# Patient Record
Sex: MALE | Race: WHITE
[De-identification: names, ages, dates, MRNs, and addresses within clinical notes are randomized per-mention and may not be internally consistent; named-entity substitution may affect disease eponyms.]

---

## 2023-02-18 ENCOUNTER — HOSPITAL ENCOUNTER (INPATIENT)
Dept: HOSPITAL 95 - ER | Age: 23
LOS: 6 days | Discharge: HOME | DRG: 896 | End: 2023-02-24
Attending: STUDENT IN AN ORGANIZED HEALTH CARE EDUCATION/TRAINING PROGRAM | Admitting: STUDENT IN AN ORGANIZED HEALTH CARE EDUCATION/TRAINING PROGRAM
Payer: COMMERCIAL

## 2023-02-18 VITALS — HEIGHT: 72 IN | BODY MASS INDEX: 19.02 KG/M2 | WEIGHT: 140.43 LBS

## 2023-02-18 DIAGNOSIS — D69.6: ICD-10-CM

## 2023-02-18 DIAGNOSIS — G92.8: ICD-10-CM

## 2023-02-18 DIAGNOSIS — D64.9: ICD-10-CM

## 2023-02-18 DIAGNOSIS — E87.0: ICD-10-CM

## 2023-02-18 DIAGNOSIS — J69.0: ICD-10-CM

## 2023-02-18 DIAGNOSIS — E88.09: ICD-10-CM

## 2023-02-18 DIAGNOSIS — E87.6: ICD-10-CM

## 2023-02-18 DIAGNOSIS — T40.2X5A: ICD-10-CM

## 2023-02-18 DIAGNOSIS — F11.23: Primary | ICD-10-CM

## 2023-02-18 DIAGNOSIS — E86.0: ICD-10-CM

## 2023-02-18 DIAGNOSIS — Z79.2: ICD-10-CM

## 2023-02-18 DIAGNOSIS — N17.9: ICD-10-CM

## 2023-02-18 DIAGNOSIS — E87.3: ICD-10-CM

## 2023-02-18 DIAGNOSIS — T43.655A: ICD-10-CM

## 2023-02-18 DIAGNOSIS — Z79.899: ICD-10-CM

## 2023-02-18 DIAGNOSIS — E83.39: ICD-10-CM

## 2023-02-18 LAB
ALBUMIN SERPL BCP-MCNC: 5.2 G/DL (ref 3.4–5)
ALBUMIN/GLOB SERPL: 1.4 {RATIO} (ref 0.8–1.8)
ALT SERPL W P-5'-P-CCNC: 56 U/L (ref 12–78)
ANION GAP SERPL CALCULATED.4IONS-SCNC: 10 MMOL/L (ref 6–16)
AST SERPL W P-5'-P-CCNC: 20 U/L (ref 12–37)
BASOPHILS # BLD AUTO: 0.03 K/MM3 (ref 0–0.23)
BASOPHILS NFR BLD AUTO: 0 % (ref 0–2)
BILIRUB SERPL-MCNC: 0.8 MG/DL (ref 0.1–1)
BUN SERPL-MCNC: 33 MG/DL (ref 8–24)
CALCIUM SERPL-MCNC: 10.4 MG/DL (ref 8.5–10.1)
CHLORIDE SERPL-SCNC: 92 MMOL/L (ref 98–108)
CO2 SERPL-SCNC: 38 MMOL/L (ref 21–32)
CREAT SERPL-MCNC: 1.27 MG/DL (ref 0.6–1.2)
DEPRECATED RDW RBC AUTO: 35.3 FL (ref 35.1–46.3)
EOSINOPHIL # BLD AUTO: 0 K/MM3 (ref 0–0.68)
EOSINOPHIL NFR BLD AUTO: 0 % (ref 0–6)
ERYTHROCYTE [DISTWIDTH] IN BLOOD BY AUTOMATED COUNT: 11.9 % (ref 11.7–14.2)
GLOBULIN SER CALC-MCNC: 3.8 G/DL (ref 2.2–4)
GLUCOSE SERPL-MCNC: 167 MG/DL (ref 70–99)
HCT VFR BLD AUTO: 49.7 % (ref 37–53)
HGB BLD-MCNC: 18.3 G/DL (ref 13.5–17.5)
IMM GRANULOCYTES # BLD AUTO: 0.05 K/MM3 (ref 0–0.1)
IMM GRANULOCYTES NFR BLD AUTO: 0 % (ref 0–1)
LYMPHOCYTES # BLD AUTO: 1.46 K/MM3 (ref 0.84–5.2)
LYMPHOCYTES NFR BLD AUTO: 9 % (ref 21–46)
MAGNESIUM SERPL-MCNC: 2.6 MG/DL (ref 1.6–2.4)
MCHC RBC AUTO-ENTMCNC: 36.8 G/DL (ref 31.5–36.5)
MCV RBC AUTO: 82 FL (ref 80–100)
MONOCYTES # BLD AUTO: 2.13 K/MM3 (ref 0.16–1.47)
MONOCYTES NFR BLD AUTO: 13 % (ref 4–13)
NEUTROPHILS # BLD AUTO: 12.51 K/MM3 (ref 1.96–9.15)
NEUTROPHILS NFR BLD AUTO: 77 % (ref 41–73)
NRBC # BLD AUTO: 0 K/MM3 (ref 0–0.02)
NRBC BLD AUTO-RTO: 0 /100 WBC (ref 0–0.2)
PLATELET # BLD AUTO: 271 K/MM3 (ref 150–400)
POTASSIUM SERPL-SCNC: 3.2 MMOL/L (ref 3.5–5.5)
PROT SERPL-MCNC: 9 G/DL (ref 6.4–8.2)
SODIUM SERPL-SCNC: 140 MMOL/L (ref 136–145)

## 2023-02-18 PROCEDURE — C1769 GUIDE WIRE: HCPCS

## 2023-02-18 PROCEDURE — C9113 INJ PANTOPRAZOLE SODIUM, VIA: HCPCS

## 2023-02-18 PROCEDURE — C1751 CATH, INF, PER/CENT/MIDLINE: HCPCS

## 2023-02-18 PROCEDURE — G0378 HOSPITAL OBSERVATION PER HR: HCPCS

## 2023-02-18 PROCEDURE — A9270 NON-COVERED ITEM OR SERVICE: HCPCS

## 2023-02-19 LAB
ALBUMIN SERPL BCP-MCNC: 4.3 G/DL (ref 3.4–5)
ALBUMIN/GLOB SERPL: 1.3 {RATIO} (ref 0.8–1.8)
ALT SERPL W P-5'-P-CCNC: 42 U/L (ref 12–78)
ANION GAP SERPL CALCULATED.4IONS-SCNC: 6 MMOL/L (ref 6–16)
AST SERPL W P-5'-P-CCNC: 18 U/L (ref 12–37)
BASOPHILS # BLD AUTO: 0.01 K/MM3 (ref 0–0.23)
BASOPHILS NFR BLD AUTO: 0 % (ref 0–2)
BILIRUB SERPL-MCNC: 1.2 MG/DL (ref 0.1–1)
BUN SERPL-MCNC: 33 MG/DL (ref 8–24)
CALCIUM SERPL-MCNC: 8.7 MG/DL (ref 8.5–10.1)
CHLORIDE SERPL-SCNC: 97 MMOL/L (ref 98–108)
CO2 SERPL-SCNC: 38 MMOL/L (ref 21–32)
CREAT SERPL-MCNC: 1.13 MG/DL (ref 0.6–1.2)
DEPRECATED RDW RBC AUTO: 36.6 FL (ref 35.1–46.3)
EOSINOPHIL # BLD AUTO: 0.03 K/MM3 (ref 0–0.68)
EOSINOPHIL NFR BLD AUTO: 0 % (ref 0–6)
ERYTHROCYTE [DISTWIDTH] IN BLOOD BY AUTOMATED COUNT: 12.1 % (ref 11.7–14.2)
GLOBULIN SER CALC-MCNC: 3.2 G/DL (ref 2.2–4)
GLUCOSE SERPL-MCNC: 145 MG/DL (ref 70–99)
HCT VFR BLD AUTO: 44.6 % (ref 37–53)
HGB BLD-MCNC: 16.2 G/DL (ref 13.5–17.5)
IMM GRANULOCYTES # BLD AUTO: 0.06 K/MM3 (ref 0–0.1)
IMM GRANULOCYTES NFR BLD AUTO: 0 % (ref 0–1)
KETONES UR STRIP-MCNC: (no result) MG/DL
LEUKOCYTE ESTERASE UR QL STRIP: (no result)
LYMPHOCYTES # BLD AUTO: 2.28 K/MM3 (ref 0.84–5.2)
LYMPHOCYTES NFR BLD AUTO: 13 % (ref 21–46)
MAGNESIUM SERPL-MCNC: 2.7 MG/DL (ref 1.6–2.4)
MCHC RBC AUTO-ENTMCNC: 36.3 G/DL (ref 31.5–36.5)
MCV RBC AUTO: 84 FL (ref 80–100)
MONOCYTES # BLD AUTO: 2.55 K/MM3 (ref 0.16–1.47)
MONOCYTES NFR BLD AUTO: 15 % (ref 4–13)
NEUTROPHILS # BLD AUTO: 12.29 K/MM3 (ref 1.96–9.15)
NEUTROPHILS NFR BLD AUTO: 71 % (ref 41–73)
NRBC # BLD AUTO: 0 K/MM3 (ref 0–0.02)
NRBC BLD AUTO-RTO: 0 /100 WBC (ref 0–0.2)
PLATELET # BLD AUTO: 219 K/MM3 (ref 150–400)
POTASSIUM SERPL-SCNC: 2.5 MMOL/L (ref 3.5–5.5)
PROT SERPL-MCNC: 7.5 G/DL (ref 6.4–8.2)
PROT UR STRIP-MCNC: (no result) MG/DL
SODIUM SERPL-SCNC: 141 MMOL/L (ref 136–145)
SP GR SPEC: 1.01 (ref 1–1.02)
UROBILINOGEN UR STRIP-MCNC: (no result) MG/DL
WBC #/AREA URNS HPF: (no result) /HPF (ref 0–5)

## 2023-02-19 NOTE — NUR
Summary: Patient admitted overnight for intractable n/v related to opiod
withdrawl. Patient arrived with fever. Recieved order for rectal tylenol as
patient was throwing up. Patient confused stating he came to the hospital to
get a job. K+ replaced overnight. Fluids running. PRNs given per MAR. Diet
changed to clear liquid. Educated patient that we would not give him food per
his requests while he was actively vomitting. Educated him to take small sips.
VSS. Call light in reach. Bed alarm on due to patient confusion.

## 2023-02-19 NOTE — NUR
CRITICAL LAB REPORTED FOR A POTASSIUM OF 2.4, DR. HATCH NOTIFIED.  NEW ORDERS
PER EMAR. REPORT RECIEVED FROM MARCELINO OAKLEY IN LAB.

## 2023-02-19 NOTE — NUR
SHIFT SUMMARY
 
PT AOX1 AT THE MOMENT, UNABLE TO TRULY VERBALIZE MUCH.  HE BECAME IRRITABLE A
FEW TIMES THROUGHOUT THE SHIFT BUT WAS REDIRECTABLE.  HE VOMITED MULTIPLE
TIMES IN HIS BED WHICH REQUIRED MULTIPLE FULL BED CHANGES.  MOST OF THE
VOMITING SPELLS OCCURRED AFTER HE CHUGGED WATER. ATTEMPTED TO EDUCATE THE PT
ON NOT DOING SO BUT INSTEAD SIPPING AND HE MAY HAVE FINALLY UNDERSTOOD AND IS
NOW SIPPING AND EATING ICE CHIPS.  LESS VOMITING HAS OCCURRED AS THE SHIFT
PROGRESSED.  PT RECIEVED 60 MEQ OF IV POTASSIUM THAT IS DOCUMENTED ON THE EMAR
AS WELL AS MULTIPLE DOSES OF ANTI-NAUSEA MEDICATION ALSO ON THE EMAR.  PT'S
FATHER AND BROTHER VISITED THIS SHIFT.  WILL REPORT TO ONCOMING NURSE.

## 2023-02-20 LAB
ALBUMIN SERPL BCP-MCNC: 4.9 G/DL (ref 3.4–5)
ANION GAP SERPL CALCULATED.4IONS-SCNC: 10 MMOL/L (ref 6–16)
ANION GAP SERPL CALCULATED.4IONS-SCNC: 8 MMOL/L (ref 6–16)
BASOPHILS # BLD AUTO: 0.03 K/MM3 (ref 0–0.23)
BASOPHILS NFR BLD AUTO: 0 % (ref 0–2)
BUN SERPL-MCNC: 46 MG/DL (ref 8–24)
BUN SERPL-MCNC: 53 MG/DL (ref 8–24)
BUPRENORPHINE UR-MCNC: DETECTED NG/ML
CALCIUM SERPL-MCNC: 10.2 MG/DL (ref 8.5–10.1)
CALCIUM SERPL-MCNC: 9.3 MG/DL (ref 8.5–10.1)
CHLORIDE SERPL-SCNC: 85 MMOL/L (ref 98–108)
CHLORIDE SERPL-SCNC: 92 MMOL/L (ref 98–108)
CO2 SERPL-SCNC: 43 MMOL/L (ref 21–32)
CO2 SERPL-SCNC: 44 MMOL/L (ref 21–32)
CREAT SERPL-MCNC: 1.43 MG/DL (ref 0.6–1.2)
CREAT SERPL-MCNC: 1.48 MG/DL (ref 0.6–1.2)
DEPRECATED RDW RBC AUTO: 36.8 FL (ref 35.1–46.3)
EOSINOPHIL # BLD AUTO: 0 K/MM3 (ref 0–0.68)
EOSINOPHIL NFR BLD AUTO: 0 % (ref 0–6)
ERYTHROCYTE [DISTWIDTH] IN BLOOD BY AUTOMATED COUNT: 11.9 % (ref 11.7–14.2)
GLUCOSE SERPL-MCNC: 143 MG/DL (ref 70–99)
GLUCOSE SERPL-MCNC: 168 MG/DL (ref 70–99)
HCT VFR BLD AUTO: 51.7 % (ref 37–53)
HGB BLD-MCNC: 18.7 G/DL (ref 13.5–17.5)
IMM GRANULOCYTES # BLD AUTO: 0.06 K/MM3 (ref 0–0.1)
IMM GRANULOCYTES NFR BLD AUTO: 0 % (ref 0–1)
KETONES UR STRIP-MCNC: (no result) MG/DL
LEUKOCYTE ESTERASE UR QL STRIP: (no result)
LYMPHOCYTES # BLD AUTO: 2.4 K/MM3 (ref 0.84–5.2)
LYMPHOCYTES NFR BLD AUTO: 15 % (ref 21–46)
MAGNESIUM SERPL-MCNC: 3.6 MG/DL (ref 1.6–2.4)
MCHC RBC AUTO-ENTMCNC: 36.2 G/DL (ref 31.5–36.5)
MCV RBC AUTO: 85 FL (ref 80–100)
MONOCYTES # BLD AUTO: 2.5 K/MM3 (ref 0.16–1.47)
MONOCYTES NFR BLD AUTO: 15 % (ref 4–13)
NEUTROPHILS # BLD AUTO: 11.35 K/MM3 (ref 1.96–9.15)
NEUTROPHILS NFR BLD AUTO: 69 % (ref 41–73)
NRBC # BLD AUTO: 0 K/MM3 (ref 0–0.02)
NRBC BLD AUTO-RTO: 0 /100 WBC (ref 0–0.2)
PH BLDV: 7.64 [PH] (ref 7.34–7.37)
PHOSPHATE SERPL-MCNC: 4.4 MG/DL (ref 2.5–4.9)
PLATELET # BLD AUTO: 253 K/MM3 (ref 150–400)
POTASSIUM SERPL-SCNC: 2.2 MMOL/L (ref 3.5–5.5)
POTASSIUM SERPL-SCNC: 2.5 MMOL/L (ref 3.5–5.5)
PROT UR STRIP-MCNC: (no result) MG/DL
SODIUM SERPL-SCNC: 139 MMOL/L (ref 136–145)
SODIUM SERPL-SCNC: 143 MMOL/L (ref 136–145)
SP GR SPEC: 1.01 (ref 1–1.02)
URN SPEC COLLECT METH UR: (no result)
UROBILINOGEN UR STRIP-MCNC: (no result) MG/DL
WBC #/AREA URNS HPF: (no result) /HPF (ref 0–5)

## 2023-02-20 PROCEDURE — 0DH67UZ INSERTION OF FEEDING DEVICE INTO STOMACH, VIA NATURAL OR ARTIFICIAL OPENING: ICD-10-PCS | Performed by: RADIOLOGY

## 2023-02-20 PROCEDURE — 4A133R1 MONITORING OF ARTERIAL SATURATION, PERIPHERAL, PERCUTANEOUS APPROACH: ICD-10-PCS | Performed by: STUDENT IN AN ORGANIZED HEALTH CARE EDUCATION/TRAINING PROGRAM

## 2023-02-20 PROCEDURE — 02HV33Z INSERTION OF INFUSION DEVICE INTO SUPERIOR VENA CAVA, PERCUTANEOUS APPROACH: ICD-10-PCS | Performed by: RADIOLOGY

## 2023-02-20 NOTE — NUR
POTASSIUM INFUSING X2 FOR A TOTAL OF 20MEQ/HR PER DR VALLEJO AND PHARMACY. K+
INFUSING THROUGH CENTRAL LINE. PICC LINE PLACED TO LEILANI; PT DEION PROCEDURE WELL.
2800CC DARK GREEN OUT PUT FROM NG TUBE. 1L NS BOLUS STARTED PER DR VALLEJO. O2
INCREASED TO 3L TO KEEP SATS >90%. TEMP 102.0 FAN PLACED, BLANKET REMOVED,
SHEET PLACED, COOL RAG TO FOREHEAD. MULTIPLE FAMILY MEMBERS AT BEDSIDE
INCLUDING PT'S FATHER AND GRANDMOTHER. PT IS WAKING UP A LITTLE MORE OFTEN,
REQUESTS SUCTION CATHETER.

## 2023-02-20 NOTE — NUR
NOTIFIED PATIENT FATHER THAT HE MOVED TO ROOM [facility] BY VOICEMAIL AT
[phone].

## 2023-02-20 NOTE — NUR
PT ADMITTED TO ICU AT 0646 FROM PCU FOR AMS. PT MINIMALLY RESPONSIVE, HE DID
NOT AWAKEN/RESPOND WHEN HE WAS TRANSFERED TO ICU BED OR WHEN HICKMAN CATH
PLACED. AFTER TURN TO LOOK AT SKIN, WE HAD BEEN SPEAKING OUT LOUD ABOUT WHAT
CAUSED SCAR ON HIS FEET, PT SPOKE UP,"IT'S FROM SMOKING". PT ABLE TO WEAKLY
FOLLOW SOME COMMANDS THEN DRIFTED BACK INTO DEEP SLEEP. TEMP 101.6 VIA TEMP
HICKMAN PROBE. 14F HICKMAN PLACED W/O DIFFICULTY. CRITICAL HIGH PH OF 7.64 CALLED
TO DR VALLEJO. 2L VIA N/C PLACED ON PT FOR SATS 90% ON RA. PT HAS SOME
HYPOVENT. PT TACHY W RATE 115, HTN /102. FINE FLAT RED RASH THAT MOVED
AROUND ON CHEST NOTED, RASH BLANCHES. K+ INFUSING. LR INFUSING. PUPILS ARE
5/5MM EQUAL AND REACTIVE, BRISK. PT APPEARS DEHYDRATED, LIPS AND TONGUE ARE
DRY AND CRACKED, URINE VERY CONCENTRATED/DARK.

## 2023-02-20 NOTE — NUR
Summary: Patient had large amounts of emisis at start of shift. Patient
assisted by nurse into shower and bedding changed. Nurse limited patient to
clear liquids only as tolerated. Provided very small amount of fluids at a
time to prevent patient consuming quickly then causing vomitting. Patient
tolerated small sips of water, two popcycles, and tylenol crushed in
applesause. UA sent to lab. Call light in reach. Patient still pretty
lethargic only responds in one word short responses when he does respond.

## 2023-02-20 NOTE — NUR
DR HATCH IN TO SEE PT. TYLENOL DE ORDERED. PT BRIEFLY INTERMIT. AWAKENS WITH
DEEP PAINFUL STIMULI. PT COUGHED UP LARGE THICK GREY SPUTUM, SAMPLE SENT.
MESSAGE LEFT TO CALL ICU TO PT'S GRANDMOTHER AND FATHER.

## 2023-02-20 NOTE — NUR
Patient very lethargic this am. Critical K+ 2.2. EKG done with abnormal
results read to Dr. Jenkins. Recieved verbal order that he wants to transfer
patient to PCU. Notified charge RN. Patient VSS currently stable, IV K+
replacement running. Patient responding to all orientation questions but
returns to sleep after.

## 2023-02-20 NOTE — NUR
PT PULLED OUT NG TUBE, NGT TUBE REPLACED, PT DEION PROCEDURE WELL. 16F NGT
PLACED TO RIGHT NARE; LOW INTERMIT. SX. DARK BROWN/GREEN OUTPUT CONT. 3700
TOTAL NGT OUTPUT THIS SHIFT. PT RECEIVED 80MEQ K+ THIS MORNING AND IS NOW
RECIEVING ANOTHER 80MEQ K+. CHEM 8 TO BE DRAWN AFTER K+ INFUSES. NS INFUSING
AT 150CC/HR. RASH HAS IMPROVED. MENTATION DID IMPROVE SLIGHTLY LATE MORNING,
EARLY AFTERNOON, PT BECOMING MORE SOMNOLENT AGAIN, AWAKENS FROM SLEEP CONFUSED
(PULLED OUT NG), ABLE TO RE-DIRECT PT. BP HAS REMAINED STABLE T/O SHIFT, OCC
HTN. SINUS TACH T/O SHIFT, RATE IMPROVED AFTER LITER BOLUS. PT COUGHED UP
THICK SPUTUM EVERY 30-60 MIN T/O SHIFT WHICH WAS SUCTIONED. PT REQUIRED DEEP
SUCTION X2; SATS DROPPED TO 87% D/T THICK SECRETIONS AND NEEDED ASSIST.
CLEARING THEM. O2 INCREASED TO 4L VIA N/C TO KEEP SATS >90%. PT'S FATHER AT
BEDSIDE NOW. TMAX 102.5. RI TYLENOL GIVEN TWICE, FAN ON PT.

## 2023-02-21 LAB
ALBUMIN SERPL BCP-MCNC: 4 G/DL (ref 3.4–5)
ANION GAP SERPL CALCULATED.4IONS-SCNC: 6 MMOL/L (ref 6–16)
BASOPHILS # BLD AUTO: 0.05 K/MM3 (ref 0–0.23)
BASOPHILS NFR BLD AUTO: 0 % (ref 0–2)
BUN SERPL-MCNC: 46 MG/DL (ref 8–24)
CALCIUM SERPL-MCNC: 9.3 MG/DL (ref 8.5–10.1)
CHLORIDE SERPL-SCNC: 110 MMOL/L (ref 98–108)
CO2 SERPL-SCNC: 31 MMOL/L (ref 21–32)
CREAT SERPL-MCNC: 1.26 MG/DL (ref 0.6–1.2)
DEPRECATED RDW RBC AUTO: 38.5 FL (ref 35.1–46.3)
EOSINOPHIL # BLD AUTO: 0.01 K/MM3 (ref 0–0.68)
EOSINOPHIL NFR BLD AUTO: 0 % (ref 0–6)
ERYTHROCYTE [DISTWIDTH] IN BLOOD BY AUTOMATED COUNT: 12 % (ref 11.7–14.2)
GLUCOSE SERPL-MCNC: 128 MG/DL (ref 70–99)
HCT VFR BLD AUTO: 50.9 % (ref 37–53)
HGB BLD-MCNC: 17.9 G/DL (ref 13.5–17.5)
IMM GRANULOCYTES # BLD AUTO: 0.12 K/MM3 (ref 0–0.1)
IMM GRANULOCYTES NFR BLD AUTO: 1 % (ref 0–1)
LYMPHOCYTES # BLD AUTO: 2.51 K/MM3 (ref 0.84–5.2)
LYMPHOCYTES NFR BLD AUTO: 11 % (ref 21–46)
MAGNESIUM SERPL-MCNC: 3.4 MG/DL (ref 1.6–2.4)
MCHC RBC AUTO-ENTMCNC: 35.2 G/DL (ref 31.5–36.5)
MCV RBC AUTO: 87 FL (ref 80–100)
MONOCYTES # BLD AUTO: 3.1 K/MM3 (ref 0.16–1.47)
MONOCYTES NFR BLD AUTO: 13 % (ref 4–13)
NEUTROPHILS # BLD AUTO: 17.64 K/MM3 (ref 1.96–9.15)
NEUTROPHILS NFR BLD AUTO: 75 % (ref 41–73)
NRBC # BLD AUTO: 0 K/MM3 (ref 0–0.02)
NRBC BLD AUTO-RTO: 0 /100 WBC (ref 0–0.2)
PH BLDV: 7.51 [PH] (ref 7.34–7.37)
PHOSPHATE SERPL-MCNC: 1.6 MG/DL (ref 2.5–4.9)
PLATELET # BLD AUTO: 220 K/MM3 (ref 150–400)
POTASSIUM SERPL-SCNC: 2.8 MMOL/L (ref 3.5–5.5)
SODIUM SERPL-SCNC: 147 MMOL/L (ref 136–145)
VANCOMYCIN TROUGH SERPL-MCNC: 22 UG/ML (ref 5–10)

## 2023-02-21 NOTE — NUR
NOTIFIED BY LAB OF CRITICAL POTASSIUM OF 2.4.  CONSULTED WITH ALIA IN PHARMACY
FOR POSSIBILITIES OF NEXT POTASSIUM ORDERS.  CALLED DR ALDANA WITH
SUGGESTIONS FROM LAB. CONSENSUS TO ORDER 60 MEQ'S  ML NS.  ORDER TO RUN
 MLS/HR TOTALLING 60 MEQ'S IN 3 HOURS.  CHARGE EDEL ZHAO INFORMED AS
WELL.

## 2023-02-21 NOTE — NUR
END OF SHIFT SUMMARY
PT COMPLACENT MOST OF THE NIGHT.  DOES NOT AWAKEN FOR MORE THAN A FEW SECONDS
AT A TIME WHEN SPOKEN TO.  FOLLOWS COMMANDS VERY SLOWLY.
 
RESP- 4 LPM NC WITH SPO2 >93%.  LUNG SOUNDS CLEAR BILATERALLY THROUGHOUT.
 
CARDIAC- ST WITH HR -120'S.  'S CONSISTANTLY THROUGHOUT NIGHT.
 
GI,-  1250 OUTPUT PER OGT.  DARK BROWN- COFFEE GROUND CONTENT ON INTERMITENT
SUCTION.  HICKMAN DRAINING TO GRAVITY WITH LIGHT YELLOW URINE PRESENT.
 
LABS DONE LATE THIS MORNING D/T K+ TREATMENT ENDING SHORTLY AFTER 5AM.
WILL CONTINUE TO MONITOR UNTIL DAYSHIFT RN IS GIVEN REPORT.

## 2023-02-21 NOTE — NUR
ASSUMED CARE OF PT AT 1915. REPORT RECEIVED AT BEDSIDE. PT PRESENTS IN BED.
HAS AIRVO IN PLACE. MAINTAINS SATURATIONS > 90 PERCENT. PT WILL OPEN EYES TO
TACTILE AND VERBAL STIMULI. HAS NGT TO LIWS. BILE COLORED DRAINAGE. PROTONIX
DRIP AT 8MG/HOUR. WILL REVIEW CHART AND PLAN OF CARE FOR THIS PT.

## 2023-02-21 NOTE — NUR
ASSUMED CARE / DR VALLEJO:
REPORT RECEIVED FROM LIZETTE AVITIA RN. ASSUMED CARE OF THIS PT AT APPROX 0700.
ON ASSESSMENT, THE PT IS RESTING QUIETLY. HE IS SOMNOLENT OVERALL BUT DOES
BEGIN TO AWAKEN W/ CONTINUED VERBAL & TACTILE STIMULUS. HE WILL ANSWER
QUESTIONS BY NODDING HEAD BUT NEEDS ASKED REPEATEDLY TO REMAIN AWAKE. PT IS
TACHYPNEIC & USING ACCESSORY MUSCLES DURING RESPIRATIONS. LS CLEAR T/O, O2
SATS BEGINNING TO TREND DOWN W/ AVG 90%, DROPPING TO 84% AT TIMES. PT
ENCOURAGED TO COUGH & DEEP BREATHE W/ O2 SATS IMPROVING TEMPORARILY. THIS RN
HAS CONTACTED SHAJI R, RT, W/ REQUEST FOR PT TO BE PLACED ON AIRVO. PT
TOLERATING WELL. MONITOR SHOWS ST W/ -130s, BP STABLE. PT NPO R/T
RESPIRATORY STATUS & SOMNOLENCE. TEMP HICKMAN PATENT/ DRAINING CLEAR YELLOW
URINE. SKIN CONDITION OVERALL INTACT, SCATTERED AREAS OF SCARRING, BURNS TO
TOPS OF BILATERAL FEET.
 
DR VALLEJO AT BEDSIDE THIS AM W/ THIS RN's CONCERNS ABOUT PT's PERSISTANT AMS &
DYSPNEA. THIS RN ALSO REQUESTS FURTHER POTASSIUM REPLETION AS THE PT's NEWEST
LABS SHOW LOW VALUE OF 2.8. DR VALLEJO HAS EVALUATED THE PT, ORDERS FOR KCL,
NAPHOS & CAGLUC PLACED. NO OTHER CHANGES AT THIS TIME.
WILL CONTINUE TO MONITOR & UPDATE AS NEEDED.

## 2023-02-21 NOTE — NUR
FULL CHG BATH DONE. PT TOLERATES THIS WELL. TACHYPNEA WITH RATES 30'S TO 40'S
AT TIMES. HE DID REMOVE HIS AIRVO EARLIER AND MAINTAINED 90-91 PERCENT ON ROOM
AIR. HE BECOMES MORE DYSPNEIC WITH AIRVO OFF. DID PLACE AIRVO BACK TO EASE
EFFORTS OF RESPIRATION. WILL CONTINUE TO MONITOR PT.

## 2023-02-21 NOTE — NUR
SHIFT SUMMARY:
NO ACUTE CHANGES SINCE PRIOR UPDATES. PT's MENTATION HAS IMPROVED SLIGHTLY &
HE IS NOW SPEAKING IN SHORT SENTENCES & ASKING APPROPRIATE QUESTIONS WHEN
AWAKE, ALTHOUGH DOES REMAIN SOMNOLENT MOST OF THE TIME. LS CLEAR T/O, PT ON
AIRVO W/ SETTINGS: 35 L/MIN & 40% FIO2. TOLERATING WELL W/ O2 SATS > 92% ON
AVG. MONITOR SHOWS ST W/ -130s, TRENDING DOWN AFTER 1L LR FLUID BOLUS
PER DR VALLEJO. PT NPO R/T AMS & COPIOUS AMNTS GASTRIC CONTENTS SUCTIONED
THROUGH NGT. NGT OUTPUT HAS GONE FROM COFFEE GROUNDS, TO JOHN RED, TO GREEN/
BILE COLORED & AMNT HAS DECREASED THIS AFTERNOON. TEMP HICKMAN PATENT/ DRAINING
CLEAR YELLOW URINE. SKIN CONDITION OVERALL INTACT, UNCHANGED. Q2H
REPOSITIONING TO MAINTAIN SKIN INTEGRITY.
WILL CONTINUE TO MONITOR & REPORT OFF TO ONCOMING RN.

## 2023-02-22 LAB
ALBUMIN SERPL BCP-MCNC: 2.8 G/DL (ref 3.4–5)
ALBUMIN/GLOB SERPL: 0.9 {RATIO} (ref 0.8–1.8)
ALT SERPL W P-5'-P-CCNC: 16 U/L (ref 12–78)
ANION GAP SERPL CALCULATED.4IONS-SCNC: 4 MMOL/L (ref 6–16)
ANION GAP SERPL CALCULATED.4IONS-SCNC: 8 MMOL/L (ref 6–16)
AST SERPL W P-5'-P-CCNC: 14 U/L (ref 12–37)
BASOPHILS # BLD AUTO: 0.01 K/MM3 (ref 0–0.23)
BASOPHILS NFR BLD AUTO: 0 % (ref 0–2)
BILIRUB SERPL-MCNC: 1.2 MG/DL (ref 0.1–1)
BUN SERPL-MCNC: 33 MG/DL (ref 8–24)
BUN SERPL-MCNC: 39 MG/DL (ref 8–24)
CALCIUM SERPL-MCNC: 8.2 MG/DL (ref 8.5–10.1)
CALCIUM SERPL-MCNC: 8.2 MG/DL (ref 8.5–10.1)
CHLORIDE SERPL-SCNC: 117 MMOL/L (ref 98–108)
CHLORIDE SERPL-SCNC: 123 MMOL/L (ref 98–108)
CO2 SERPL-SCNC: 23 MMOL/L (ref 21–32)
CO2 SERPL-SCNC: 23 MMOL/L (ref 21–32)
CREAT SERPL-MCNC: 1.09 MG/DL (ref 0.6–1.2)
CREAT SERPL-MCNC: 1.12 MG/DL (ref 0.6–1.2)
DEPRECATED RDW RBC AUTO: 39.3 FL (ref 35.1–46.3)
EOSINOPHIL # BLD AUTO: 0.01 K/MM3 (ref 0–0.68)
EOSINOPHIL NFR BLD AUTO: 0 % (ref 0–6)
ERYTHROCYTE [DISTWIDTH] IN BLOOD BY AUTOMATED COUNT: 12.2 % (ref 11.7–14.2)
GLOBULIN SER CALC-MCNC: 3.2 G/DL (ref 2.2–4)
GLUCOSE SERPL-MCNC: 102 MG/DL (ref 70–99)
GLUCOSE SERPL-MCNC: 97 MG/DL (ref 70–99)
HCT VFR BLD AUTO: 41.7 % (ref 37–53)
HGB BLD-MCNC: 14.6 G/DL (ref 13.5–17.5)
IMM GRANULOCYTES # BLD AUTO: 0.03 K/MM3 (ref 0–0.1)
IMM GRANULOCYTES NFR BLD AUTO: 0 % (ref 0–1)
LYMPHOCYTES # BLD AUTO: 2.13 K/MM3 (ref 0.84–5.2)
LYMPHOCYTES NFR BLD AUTO: 16 % (ref 21–46)
MAGNESIUM SERPL-MCNC: 2.6 MG/DL (ref 1.6–2.4)
MCHC RBC AUTO-ENTMCNC: 35 G/DL (ref 31.5–36.5)
MCV RBC AUTO: 87 FL (ref 80–100)
MONOCYTES # BLD AUTO: 1.52 K/MM3 (ref 0.16–1.47)
MONOCYTES NFR BLD AUTO: 12 % (ref 4–13)
NEUTROPHILS # BLD AUTO: 9.45 K/MM3 (ref 1.96–9.15)
NEUTROPHILS NFR BLD AUTO: 72 % (ref 41–73)
NRBC # BLD AUTO: 0 K/MM3 (ref 0–0.02)
NRBC BLD AUTO-RTO: 0 /100 WBC (ref 0–0.2)
PHOSPHATE SERPL-MCNC: 1.4 MG/DL (ref 2.5–4.9)
PHOSPHATE SERPL-MCNC: 2.8 MG/DL (ref 2.5–4.9)
PLATELET # BLD AUTO: 128 K/MM3 (ref 150–400)
POTASSIUM SERPL-SCNC: 2.7 MMOL/L (ref 3.5–5.5)
POTASSIUM SERPL-SCNC: 3.3 MMOL/L (ref 3.5–5.5)
PROT SERPL-MCNC: 6 G/DL (ref 6.4–8.2)
SODIUM SERPL-SCNC: 148 MMOL/L (ref 136–145)
SODIUM SERPL-SCNC: 150 MMOL/L (ref 136–145)
VANCOMYCIN TROUGH SERPL-MCNC: 11.5 UG/ML (ref 5–10)

## 2023-02-22 NOTE — NUR
ASSUMED CARE OF Argyle AT 0700, HE HAS BEEN AWAKE AND ASKING FOR SOMETHING TO
DRINK SINCE THAT TIME, WHEN  CAME IN TO SEE HIM HE SAID HE COULD HAVE
CLEAR LIQUIDS. AT THAT TIME HE WAS ABLE TO TAKE IN ICE WATER WITHOUT INCIDENT.
HE WAS GIVEN A CLEAR LIQUID BREAKFAST TRAY INWHICH HE TOOK IN ALL BUT THE
JELLO. HE HAS SINCE HAD A CRANBERRY JUICE/JERRY MIST DRINK, TWO MORE GLASSES
OF ICE WATER AND NOW IS TAKING IN A CUP OF HOT COCOA PER HIS REQUEST. HE IS
ORIENTED TO ALL BUT DAY. HE KNOWS WHAT IS HAPPENING AND WHY HE IS HERE. HE IS
CURRENTLY RECEIVING SODIUM PHOSPHATE, POTASSIUM RIDERS AND PROTONIX. HIS SKIN
IS HOT TO THE TOUCH AND HAS INTERMITTENT RED BLOTCHES OVER HIS CHEST AND ABD.
HE WAS GIVEN TYLENOL FOR TEMP 101.3 AND IT HAS INCREASED .4. HE IS ABLE
TO COUGH INDEPENDENTLY AND EXPECTORATE MCKEON/BISHOP RETURN VIA THE YANKAUER. HE IS
COOPERATIVE TO CARE, TEACHING ABOUT WITHDRAWAL SYMPTOMS AND EXPECTATIONS.

## 2023-02-22 NOTE — NUR
PT HAS HAD > 800 ML OUPUT FROM OGT. HAS RUN LOW GRADE FEVER THROUGHOUT THE
SHIFT. HAS REMAINED < 101.3. HAVE PROVIDED FAN. PT ONLY SPOKEN WORDS TO THIS
RN WAS THAT HE WAS "COLD". DID EXPLAIN TO PT THAT HE WAS WITH FEVER, AND THAT
TOO MANY EXTRA BLANKETS COULD MAKE FEVER HIGHER. PT HAS AT TIMES REMOVED HIS
AIRVO. WITH DOING THIS, HIS SATURATIONS DID REMAIN 88-91 PERCENT. HIS
RESPIRATORY EFFORT DID INCREASE. HAVE PROVIDED YAUNKEUR FOR PT TO SELF SUCTION
WHEN HE IS ABLE TO EXPECTORATE SPUTUM. CREAM COLORED SECRETIONS NOTED. WILL
CONTINUE TO MONITOR PT, AND WILL REPORT OFF TO ONCOMING RN.

## 2023-02-22 NOTE — NUR
AFTER SPEAKING WITH THE PATIENT ABOUT 'S PLAN TO KEEP THE NG TUBE IN
FOR A BIT LONGER, CAROL PULLED IT OUT. WITHIN 5 MINUTES HE SAID HE WAS FEELING
NAUSEATED AND HE THREW UP 4 TIMES ABOUT 150 ML EACH TIME. THE NGT WAS REPLACED
WITH AN IMMEDIATE RETURN OF 1200ML BILE COLOR IN SUCTION CANNISTER, THEN AN
ADDITIONAL 600ML INTO A SECOND CANNISTER. HE THEN WENT FAST ASLEEP. HIS IV WAS
REMOVED IN THE LEFT AC AS IT WAS LEAKING, REPLACED IN THE LEFT FOREARM.
DRESSING FOR THE RIGHT PICC LINE CHANGED PER PROTOCOL. BP REMAINS 130'S/80'S
AND HEART RATE IS NOW DOWN TO 90'S. SATS REMAIN >95% ON ROOM AIR. LINENS WERE
CHANGED AGAIN AFTER THE EMESIS. PROTONIX GTT CONTINUES, NS TKO FOR
ANTIBIOTICS.

## 2023-02-22 NOTE — NUR
CAROL HAS BEEN COMPLAINING THAT HE IS COLD, DENIES ANY N/V. HE WAS INCONTINENT
OF LIQUID STOOL. DID CLEAN HIM UP WITH ONLY MINIMAL ASSISTANCE FROM HIM. HE
C/O FEELING WEAK AND LOWSY. SAYS THAT THIS "SUCKS" REGARDING WHAT HE IS
EXPERIENCING. REMINDED IT TAKES TIME TO RECOVER FROM HEAVY ADDICTION. HIS TEMP
AND BP REMAIN ELEVATED. HEART RATE IS COMING DOWN SOME. CONTINUES TO TOLERATE
LIQUIDS.

## 2023-02-22 NOTE — NUR
UPDATE TO , REGARDING PT'S GOOD INTAKE AND 2 LIQUID BM'S. SHE PREFERS
TO KEEP THE NG IN FOR NOW. FAMILY AWARE OF PLAN. DAD AND HIS S/O ARE HERE, PT
RESPONDS WELL TO DAD. STILL VERY WITHDRAWN AND DIFFICULT TO ENGAGE. CONT. C/O
BEING COLD, TEMP .7.

## 2023-02-22 NOTE — NUR
ASSUMED CARE OF PT AT 1915. REPORT RECEIVED AT BEDSIDE. PT PRESENTS IN BED.
ALERT AND AGITATED. PT FREQUENTLY ASKING FOR SOMETHING TO DRINK, AND TO EAT.
EXPLAINED IN DETAIL WHY THIS IS CONTRAINDICATED AND ORDERED BY THE PHYSICIAN
THAT HE IS NPO. PT PUTS HIS CALL LIGHT ON AS THIS RN IS EXPLAINING TO HIM THE
ORDERS. PT REPEATEDLY PUTS HIS CALL LIGHT ON AGAIN WHILE THIS RN IN ROOM. PT
THREATENS THAT HE WILL PULL EVERYTHING OUT AND GO TO THE SINK TO DRINK WATER.
EXPLAINED TO PT WHY THIS IS UNSAFE. HAVE CALLED DR VALLEJO WITH UPDATE ON PT'S
POTASSIUM LEVEL. ORDER RECEIVED FOR POTASSIUM REPLACEMENT. WILL REVIEW CHART
AND PLAN OF CARE FOR THIS PT.

## 2023-02-23 LAB
ALBUMIN SERPL BCP-MCNC: 2.7 G/DL (ref 3.4–5)
ANION GAP SERPL CALCULATED.4IONS-SCNC: 3 MMOL/L (ref 6–16)
BASOPHILS # BLD AUTO: 0 K/MM3 (ref 0–0.23)
BASOPHILS NFR BLD AUTO: 0 % (ref 0–2)
BUN SERPL-MCNC: 26 MG/DL (ref 8–24)
CALCIUM SERPL-MCNC: 8.3 MG/DL (ref 8.5–10.1)
CHLORIDE SERPL-SCNC: 123 MMOL/L (ref 98–108)
CO2 SERPL-SCNC: 22 MMOL/L (ref 21–32)
CREAT SERPL-MCNC: 1.06 MG/DL (ref 0.6–1.2)
DEPRECATED RDW RBC AUTO: 38.1 FL (ref 35.1–46.3)
EOSINOPHIL # BLD AUTO: 0.09 K/MM3 (ref 0–0.68)
EOSINOPHIL NFR BLD AUTO: 1 % (ref 0–6)
ERYTHROCYTE [DISTWIDTH] IN BLOOD BY AUTOMATED COUNT: 12.1 % (ref 11.7–14.2)
GLUCOSE SERPL-MCNC: 102 MG/DL (ref 70–99)
HCT VFR BLD AUTO: 35.1 % (ref 37–53)
HGB BLD-MCNC: 12.7 G/DL (ref 13.5–17.5)
IMM GRANULOCYTES # BLD AUTO: 0.02 K/MM3 (ref 0–0.1)
IMM GRANULOCYTES NFR BLD AUTO: 0 % (ref 0–1)
LYMPHOCYTES # BLD AUTO: 2.12 K/MM3 (ref 0.84–5.2)
LYMPHOCYTES NFR BLD AUTO: 22 % (ref 21–46)
MAGNESIUM SERPL-MCNC: 2.4 MG/DL (ref 1.6–2.4)
MCHC RBC AUTO-ENTMCNC: 36.2 G/DL (ref 31.5–36.5)
MCV RBC AUTO: 86 FL (ref 80–100)
MONOCYTES # BLD AUTO: 1.13 K/MM3 (ref 0.16–1.47)
MONOCYTES NFR BLD AUTO: 12 % (ref 4–13)
NEUTROPHILS # BLD AUTO: 6.38 K/MM3 (ref 1.96–9.15)
NEUTROPHILS NFR BLD AUTO: 66 % (ref 41–73)
NRBC # BLD AUTO: 0 K/MM3 (ref 0–0.02)
NRBC BLD AUTO-RTO: 0 /100 WBC (ref 0–0.2)
PHOSPHATE SERPL-MCNC: 1.4 MG/DL (ref 2.5–4.9)
PHOSPHATE SERPL-MCNC: 2.5 MG/DL (ref 2.5–4.9)
PLATELET # BLD AUTO: 115 K/MM3 (ref 150–400)
POTASSIUM SERPL-SCNC: 2.9 MMOL/L (ref 3.5–5.5)
POTASSIUM SERPL-SCNC: 3.4 MMOL/L (ref 3.5–5.5)
SODIUM SERPL-SCNC: 148 MMOL/L (ref 136–145)

## 2023-02-23 NOTE — NUR
HAVE GOTTEN PT UP TO BEDSIDE COMMODE WHEREAS HE HAS LIQUID BROWN STOOL. PT
ABLE TO STAND AND AMBULATE IN ROOM WITHOUT VERTIGO. MILDLY UNSTEADY ON FEET.
PT SAT UP IN RECLINER CHAIR FOR APPROX ONE HOUR THIS MORNING. CURRENTLY BACK
TO BED. HAVE CLAMPED NGT AGAIN. HAVE PROVIDED 88 ML POPCICLE FOR PT. WILL
INFORM ONCOMING RN OF THIS. WILL HAVE NGT BACK TO SUCTION IN APPROX AN HOUR
AND ASSESS NGT OUTPUT. WILL CONTINUE TO MONITOR PT, AND WILL REPORT OFF TO
ONCOMING RN.

## 2023-02-23 NOTE — NUR
TRANSFER
PT HAS CONTINUED TO DENY ANY NAUSEA WITH THE NG CLAMPED. PT WAS ABLE TO WALK
TO THE SHOWER AND BATHE HIMSELF, THEN WC TO GET BACK TO THE ROOM BECAUSE HE
WAS WORN OUT AND SHAKY. TALKED TO TINY HATCH AND SHE OK'D FOR PT TO TRANSFER TO
MEDICAL FLOOR WITH TELE. REPORT GIVEN TO MED FLOOR RN. PT TRANSFERRED VIA WC.
ALL BELONGINGS SENT WITH PT.

## 2023-02-23 NOTE — NUR
PT FREQUENTLY USING CALL LIGHT FOR SMALL TASKS. DID INQUIRE IF PT WAS BORED
RESULTING IN HIM CALLING FOR ASSIST SO FREQUENTLY. PT ADMITS THAT THIS WAS THE
CASE. PT REQUESTS HOSPITAL SOCKS. THIS PROVIDED.

## 2023-02-23 NOTE — NUR
CLAMPED NGT FOR ONE AND A HALF HOUR AFTER GIVING PROBIOTIC. DID AT THAT TIME
HAVE PT SWALLOW PILL WITH SOME WATER. WHEN OPENING CLAMP, PT ONLY PUTS OUT
APPROX 50 ML BILE TINGED LIQUID PER NGT. HAVE PROVIDED APPROX 150 ML JERRY
MYST, AND A HALF CUP OF ICE. CLAMPED NGT AGAIN. AND WILL WAIT APPROX 1 TO 2
HOURS AND CHECK GASTRIC VOLUME. EXPLAINED THIS TO PT.

## 2023-02-23 NOTE — NUR
Reassessment
Pt got up to the chair briefly this morning, but then wanted back in bed after
less than an hour. Discussed the importance of activity for stimulating gut
movement and pt verbalizes understanding, but is very upset that he can't have
as much liquid as he wants so he just wants to lay down and rest. Pt educated
on the need to go very slow with liquids, given the nausea he experienced
yesterday, but pt is very irritated at that. Pt talked about wanting to jsut
go home and pt edcuated on the severity of his electolyte imblalances and that
potential risks. Otherwise pt has been alert, oriented, clear lungs, SR/ST in
the upper 90-low 100s. NG has been clamped since about 0500 this mornign and
pt denies any nausea. One small BM this am. Plan for shower later once
electrolytes are done infusing. Multiple family members have been by to visit
with pt.

## 2023-02-23 NOTE — NUR
Received report from ICU RN.
 
Pt walked from wheel chair to bed with unsteady gait. Pt temp 99.9 will
continue to monitor pt has been running a temp. Pt ng tube still clamped.
 
Pt has no complaints of n/v. Pt diet increased to clear liquid. Pt tolerated
100% of his dinner. Pt complaining of being cold temp 100.1 medicated per
emar,
 
will continue to monitor.

## 2023-02-24 LAB
ALBUMIN SERPL BCP-MCNC: 2.5 G/DL (ref 3.4–5)
ANION GAP SERPL CALCULATED.4IONS-SCNC: 4 MMOL/L (ref 6–16)
BASOPHILS # BLD AUTO: 0.01 K/MM3 (ref 0–0.23)
BASOPHILS NFR BLD AUTO: 0 % (ref 0–2)
BUN SERPL-MCNC: 18 MG/DL (ref 8–24)
CALCIUM SERPL-MCNC: 8 MG/DL (ref 8.5–10.1)
CHLORIDE SERPL-SCNC: 117 MMOL/L (ref 98–108)
CO2 SERPL-SCNC: 23 MMOL/L (ref 21–32)
CREAT SERPL-MCNC: 1.32 MG/DL (ref 0.6–1.2)
DEPRECATED RDW RBC AUTO: 36.2 FL (ref 35.1–46.3)
EOSINOPHIL # BLD AUTO: 0.2 K/MM3 (ref 0–0.68)
EOSINOPHIL NFR BLD AUTO: 3 % (ref 0–6)
ERYTHROCYTE [DISTWIDTH] IN BLOOD BY AUTOMATED COUNT: 11.9 % (ref 11.7–14.2)
GLUCOSE SERPL-MCNC: 132 MG/DL (ref 70–99)
HCT VFR BLD AUTO: 33.4 % (ref 37–53)
HGB BLD-MCNC: 12.1 G/DL (ref 13.5–17.5)
IMM GRANULOCYTES # BLD AUTO: 0.03 K/MM3 (ref 0–0.1)
IMM GRANULOCYTES NFR BLD AUTO: 0 % (ref 0–1)
LYMPHOCYTES # BLD AUTO: 1.95 K/MM3 (ref 0.84–5.2)
LYMPHOCYTES NFR BLD AUTO: 24 % (ref 21–46)
MAGNESIUM SERPL-MCNC: 2.3 MG/DL (ref 1.6–2.4)
MCHC RBC AUTO-ENTMCNC: 36.2 G/DL (ref 31.5–36.5)
MCV RBC AUTO: 84 FL (ref 80–100)
MONOCYTES # BLD AUTO: 1.27 K/MM3 (ref 0.16–1.47)
MONOCYTES NFR BLD AUTO: 16 % (ref 4–13)
NEUTROPHILS # BLD AUTO: 4.55 K/MM3 (ref 1.96–9.15)
NEUTROPHILS NFR BLD AUTO: 57 % (ref 41–73)
NRBC # BLD AUTO: 0 K/MM3 (ref 0–0.02)
NRBC BLD AUTO-RTO: 0 /100 WBC (ref 0–0.2)
PHOSPHATE SERPL-MCNC: 2.3 MG/DL (ref 2.5–4.9)
PLATELET # BLD AUTO: 114 K/MM3 (ref 150–400)
POTASSIUM SERPL-SCNC: 2.8 MMOL/L (ref 3.5–5.5)
SODIUM SERPL-SCNC: 144 MMOL/L (ref 136–145)
VANCOMYCIN TROUGH SERPL-MCNC: 17.9 UG/ML (ref 5–10)

## 2023-02-24 NOTE — NUR
Shift Summary
 
No c/o of nausea t/o shift. Pt stated his last bout of nausea was 2/22 at
1600. NG tube was bothering pt and it became disloged, I removed the NG tube
and then called the hospitalist for an order to D/C. Pt tolerating clear
liquid diet. Pt has 3-4 loose watery BMs this shift with some yellow
coloration. AOx4, having difficulty sleeping. Pt c/o restless leg sydrome,
called hospitalist who ordered 0.125 mg Mirapex PRN at bedtime. Pt states this
medicaiton helped greatly. VSS, pleasant and cooperative.